# Patient Record
Sex: MALE | Race: WHITE | Employment: FULL TIME | URBAN - METROPOLITAN AREA
[De-identification: names, ages, dates, MRNs, and addresses within clinical notes are randomized per-mention and may not be internally consistent; named-entity substitution may affect disease eponyms.]

---

## 2018-05-16 ENCOUNTER — OFFICE VISIT (OUTPATIENT)
Dept: PRIMARY CARE CLINIC | Age: 48
End: 2018-05-16
Payer: COMMERCIAL

## 2018-05-16 VITALS
SYSTOLIC BLOOD PRESSURE: 126 MMHG | DIASTOLIC BLOOD PRESSURE: 84 MMHG | TEMPERATURE: 98.4 F | HEIGHT: 69 IN | BODY MASS INDEX: 21.62 KG/M2 | WEIGHT: 146 LBS | RESPIRATION RATE: 14 BRPM | HEART RATE: 68 BPM

## 2018-05-16 DIAGNOSIS — Z00.00 ANNUAL PHYSICAL EXAM: Primary | ICD-10-CM

## 2018-05-16 DIAGNOSIS — Z12.5 SCREENING FOR PROSTATE CANCER: ICD-10-CM

## 2018-05-16 DIAGNOSIS — E78.5 DYSLIPIDEMIA: ICD-10-CM

## 2018-05-16 PROCEDURE — 99396 PREV VISIT EST AGE 40-64: CPT | Performed by: FAMILY MEDICINE

## 2018-05-16 ASSESSMENT — PATIENT HEALTH QUESTIONNAIRE - PHQ9
1. LITTLE INTEREST OR PLEASURE IN DOING THINGS: 0
SUM OF ALL RESPONSES TO PHQ9 QUESTIONS 1 & 2: 0
2. FEELING DOWN, DEPRESSED OR HOPELESS: 0
SUM OF ALL RESPONSES TO PHQ QUESTIONS 1-9: 0

## 2018-05-16 ASSESSMENT — ENCOUNTER SYMPTOMS
NAUSEA: 0
STRIDOR: 0
PHOTOPHOBIA: 0
APNEA: 0
DIARRHEA: 0
CHEST TIGHTNESS: 0
CONSTIPATION: 0
EYE DISCHARGE: 0
CHOKING: 0
COLOR CHANGE: 0
WHEEZING: 0
FACIAL SWELLING: 0
EYE PAIN: 0
ABDOMINAL PAIN: 0
EYE REDNESS: 0

## 2018-06-02 DIAGNOSIS — Z00.00 ANNUAL PHYSICAL EXAM: ICD-10-CM

## 2018-06-02 DIAGNOSIS — E78.5 DYSLIPIDEMIA: ICD-10-CM

## 2018-06-02 DIAGNOSIS — Z12.5 SCREENING FOR PROSTATE CANCER: ICD-10-CM

## 2018-06-02 LAB
ALBUMIN SERPL-MCNC: 4.9 G/DL (ref 3.9–4.9)
ALP BLD-CCNC: 70 U/L (ref 35–104)
ALT SERPL-CCNC: 20 U/L (ref 0–41)
ANION GAP SERPL CALCULATED.3IONS-SCNC: 18 MEQ/L (ref 7–13)
AST SERPL-CCNC: 22 U/L (ref 0–40)
BASOPHILS ABSOLUTE: 0 K/UL (ref 0–0.2)
BASOPHILS RELATIVE PERCENT: 0.5 %
BILIRUB SERPL-MCNC: 0.6 MG/DL (ref 0–1.2)
BUN BLDV-MCNC: 13 MG/DL (ref 6–20)
CALCIUM SERPL-MCNC: 8.9 MG/DL (ref 8.6–10.2)
CHLORIDE BLD-SCNC: 99 MEQ/L (ref 98–107)
CHOLESTEROL, TOTAL: 172 MG/DL (ref 0–199)
CO2: 26 MEQ/L (ref 22–29)
CREAT SERPL-MCNC: 0.86 MG/DL (ref 0.7–1.2)
EOSINOPHILS ABSOLUTE: 0.1 K/UL (ref 0–0.7)
EOSINOPHILS RELATIVE PERCENT: 2.7 %
GFR AFRICAN AMERICAN: >60
GFR NON-AFRICAN AMERICAN: >60
GLOBULIN: 2.5 G/DL (ref 2.3–3.5)
GLUCOSE BLD-MCNC: 77 MG/DL (ref 74–109)
HCT VFR BLD CALC: 45 % (ref 42–52)
HDLC SERPL-MCNC: 39 MG/DL (ref 40–59)
HEMOGLOBIN: 15.2 G/DL (ref 14–18)
LDL CHOLESTEROL CALCULATED: 113 MG/DL (ref 0–129)
LYMPHOCYTES ABSOLUTE: 1 K/UL (ref 1–4.8)
LYMPHOCYTES RELATIVE PERCENT: 22.6 %
MCH RBC QN AUTO: 31.3 PG (ref 27–31.3)
MCHC RBC AUTO-ENTMCNC: 33.7 % (ref 33–37)
MCV RBC AUTO: 92.8 FL (ref 80–100)
MONOCYTES ABSOLUTE: 0.4 K/UL (ref 0.2–0.8)
MONOCYTES RELATIVE PERCENT: 8.5 %
NEUTROPHILS ABSOLUTE: 3 K/UL (ref 1.4–6.5)
NEUTROPHILS RELATIVE PERCENT: 65.7 %
PDW BLD-RTO: 12.5 % (ref 11.5–14.5)
PLATELET # BLD: 219 K/UL (ref 130–400)
POTASSIUM SERPL-SCNC: 4.4 MEQ/L (ref 3.5–5.1)
PROSTATE SPECIFIC ANTIGEN: 0.72 NG/ML
RBC # BLD: 4.85 M/UL (ref 4.7–6.1)
SODIUM BLD-SCNC: 143 MEQ/L (ref 132–144)
TOTAL PROTEIN: 7.4 G/DL (ref 6.4–8.1)
TRIGL SERPL-MCNC: 100 MG/DL (ref 0–200)
WBC # BLD: 4.6 K/UL (ref 4.8–10.8)

## 2018-06-07 ENCOUNTER — PATIENT MESSAGE (OUTPATIENT)
Dept: PRIMARY CARE CLINIC | Age: 48
End: 2018-06-07

## 2024-04-24 ENCOUNTER — OFFICE VISIT (OUTPATIENT)
Dept: PRIMARY CARE | Facility: CLINIC | Age: 54
End: 2024-04-24
Payer: COMMERCIAL

## 2024-04-24 VITALS
DIASTOLIC BLOOD PRESSURE: 80 MMHG | WEIGHT: 144 LBS | HEART RATE: 70 BPM | BODY MASS INDEX: 21.33 KG/M2 | SYSTOLIC BLOOD PRESSURE: 126 MMHG | HEIGHT: 69 IN | RESPIRATION RATE: 16 BRPM | OXYGEN SATURATION: 100 %

## 2024-04-24 DIAGNOSIS — Z00.00 ANNUAL PHYSICAL EXAM: ICD-10-CM

## 2024-04-24 DIAGNOSIS — Z00.00 HEALTHCARE MAINTENANCE: ICD-10-CM

## 2024-04-24 DIAGNOSIS — E78.5 DYSLIPIDEMIA: ICD-10-CM

## 2024-04-24 PROBLEM — R53.83 FATIGUE: Status: ACTIVE | Noted: 2024-04-24

## 2024-04-24 PROCEDURE — 99396 PREV VISIT EST AGE 40-64: CPT | Performed by: FAMILY MEDICINE

## 2024-04-24 ASSESSMENT — PATIENT HEALTH QUESTIONNAIRE - PHQ9
2. FEELING DOWN, DEPRESSED OR HOPELESS: NOT AT ALL
SUM OF ALL RESPONSES TO PHQ9 QUESTIONS 1 AND 2: 0
1. LITTLE INTEREST OR PLEASURE IN DOING THINGS: NOT AT ALL

## 2024-04-24 NOTE — PROGRESS NOTES
Subjective   Patient ID: Ramon Feliz is a 53 y.o. male who presents for Annual Exam.    Patient presents in office for a annual exam. Patient has no specific requirements for this examination.          Review of Systems  12 Systems have been reviewed as follows.   Constitutional: Fever, weight gain, weight loss, appetite change, night sweats, fatigue, chills.  Eyes : blurry, double vision, vision, loss, tearing, redness, pain, sensitivity to light, glaucoma.  Ears, nose, mouth, and throat: Hearing loss, ringing in the ears, ear pain, nasal congestion, nasal drainage, nosebleeds, mouth, throat, irritation tooth problem.  Cardiovascular :chest pain, pressure, heart racing, palpitations, sweating, leg swelling, high or low blood pressure  Pulmonary: Cough, yellow or green sputum, blood and sputum, shortness of breath, wheezing  Gastrointestinal: Nausea, vomiting, diarrhea, constipation, pain, blood in stool, or vomitus, heartburn, difficulty swallowing  Genitourinary: incontinence, abnormal bleeding, abnormal discharge, urinary frequency, urinary hesitancy, pain, impotence sexual problem, infection, urinary retention  Musculoskeletal: Pain, stiffness, joint, redness or warmth, arthritis, back pain, weakness, muscle wasting, sprain or fracture  Neuro: Weight weakness, dizziness, change in voice, change in taste change in vision, change in hearing, loss, or change of sensation, trouble walking, balance problems coordination problems, shaking, speech problem  Endocrine , cold or heat intolerance, blood sugar problem, weight gain or loss missed periods hot flashes, sweats, change in body hair, change in libido, increased thirst, increased urination  Heme/lymph: Swelling, bleeding, problem anemia, bruising, enlarged lymph nodes  Allergic/immunologic: H. plus nasal drip, watery itchy eyes, nasal drainage, immunosuppressed  The above were reviewed and noted negative except as noted in HPI and Problem List.    Objective  "  /80 (BP Location: Left arm, Patient Position: Sitting, BP Cuff Size: Adult)   Pulse 70   Resp 16   Ht 1.753 m (5' 9\")   Wt 65.3 kg (144 lb)   SpO2 100%   BMI 21.27 kg/m²     Physical Exam  Constitutional: Well developed, well nourished, alert and in no acute distress  Eyes: Normal external exam. Pupils equally round and reactive to light with normal accommodation and extraocular movements intact.  Neck: Supple, no lymphadenopathy or masses.  Cardiovascular: Regular rate and rhythm, normal S1 and S2, no murmurs, gallops, or rubs. Radial pulses normal. No peripheral edema.  Abdomen: soft, non tender, distended, no masses or HSM.  Pulmonary: No respiratory distress, lungs clear to auscultation bilaterally. No wheezes, rhonchi, rales.  Skin: Warm, well perfused, normal skin turgor and color.  Neurologic: Cranial nerves II-XII grossly intact.  Psychiatric: Mood calm and affect normal  Musculoskeletal: Moving all extremities without restriction    Assessment/Plan   Problem List Items Addressed This Visit             ICD-10-CM    Dyslipidemia E78.5    Relevant Orders    CT cardiac scoring wo IV contrast     Other Visit Diagnoses         Codes    Annual physical exam     Z00.00    Relevant Orders    CBC and Auto Differential    Comprehensive Metabolic Panel    Lipid Panel    Prostate Specific Antigen, Screen    Thyroid Stimulating Hormone    Vitamin D 25-Hydroxy,Total (for eval of Vitamin D levels)    T4    T4, free    Healthcare maintenance     Z00.00    Relevant Orders    CBC and Auto Differential    Comprehensive Metabolic Panel    Lipid Panel    Prostate Specific Antigen, Screen    Thyroid Stimulating Hormone    Vitamin D 25-Hydroxy,Total (for eval of Vitamin D levels)    T4    T4, free             Scribe Attestation  By signing my name below, I, Alphonso Patel DO , Scribe   attest that this documentation has been prepared under the direction and in the presence of Alphonso Patel DO.    Provider " Attestation - Scribe documentation    All medical record entries made by the Scribe were at my direction and personally dictated by me. I have reviewed the chart and agree that the record accurately reflects my personal performance of the history, physical exam, discussion and plan.

## 2024-04-25 ENCOUNTER — LAB (OUTPATIENT)
Dept: LAB | Facility: LAB | Age: 54
End: 2024-04-25
Payer: COMMERCIAL

## 2024-04-25 DIAGNOSIS — Z00.00 HEALTHCARE MAINTENANCE: ICD-10-CM

## 2024-04-25 DIAGNOSIS — Z00.00 ANNUAL PHYSICAL EXAM: ICD-10-CM

## 2024-04-25 LAB
25(OH)D3 SERPL-MCNC: 22 NG/ML (ref 30–100)
ALBUMIN SERPL BCP-MCNC: 4.7 G/DL (ref 3.4–5)
ALP SERPL-CCNC: 66 U/L (ref 33–120)
ALT SERPL W P-5'-P-CCNC: 19 U/L (ref 10–52)
ANION GAP SERPL CALC-SCNC: 10 MMOL/L (ref 10–20)
AST SERPL W P-5'-P-CCNC: 19 U/L (ref 9–39)
BASOPHILS # BLD AUTO: 0.02 X10*3/UL (ref 0–0.1)
BASOPHILS NFR BLD AUTO: 0.4 %
BILIRUB SERPL-MCNC: 0.9 MG/DL (ref 0–1.2)
BUN SERPL-MCNC: 15 MG/DL (ref 6–23)
CALCIUM SERPL-MCNC: 9.5 MG/DL (ref 8.6–10.3)
CHLORIDE SERPL-SCNC: 103 MMOL/L (ref 98–107)
CHOLEST SERPL-MCNC: 185 MG/DL (ref 0–199)
CHOLESTEROL/HDL RATIO: 4.4
CO2 SERPL-SCNC: 32 MMOL/L (ref 21–32)
CREAT SERPL-MCNC: 1.03 MG/DL (ref 0.5–1.3)
EGFRCR SERPLBLD CKD-EPI 2021: 87 ML/MIN/1.73M*2
EOSINOPHIL # BLD AUTO: 0.11 X10*3/UL (ref 0–0.7)
EOSINOPHIL NFR BLD AUTO: 2 %
ERYTHROCYTE [DISTWIDTH] IN BLOOD BY AUTOMATED COUNT: 11.9 % (ref 11.5–14.5)
GLUCOSE SERPL-MCNC: 87 MG/DL (ref 74–99)
HCT VFR BLD AUTO: 45.8 % (ref 41–52)
HDLC SERPL-MCNC: 42.4 MG/DL
HGB BLD-MCNC: 15.6 G/DL (ref 13.5–17.5)
IMM GRANULOCYTES # BLD AUTO: 0.02 X10*3/UL (ref 0–0.7)
IMM GRANULOCYTES NFR BLD AUTO: 0.4 % (ref 0–0.9)
LDLC SERPL CALC-MCNC: 121 MG/DL
LYMPHOCYTES # BLD AUTO: 1.38 X10*3/UL (ref 1.2–4.8)
LYMPHOCYTES NFR BLD AUTO: 25.2 %
MCH RBC QN AUTO: 31 PG (ref 26–34)
MCHC RBC AUTO-ENTMCNC: 34.1 G/DL (ref 32–36)
MCV RBC AUTO: 91 FL (ref 80–100)
MONOCYTES # BLD AUTO: 0.47 X10*3/UL (ref 0.1–1)
MONOCYTES NFR BLD AUTO: 8.6 %
NEUTROPHILS # BLD AUTO: 3.48 X10*3/UL (ref 1.2–7.7)
NEUTROPHILS NFR BLD AUTO: 63.4 %
NON HDL CHOLESTEROL: 143 MG/DL (ref 0–149)
NRBC BLD-RTO: 0 /100 WBCS (ref 0–0)
PLATELET # BLD AUTO: 256 X10*3/UL (ref 150–450)
POTASSIUM SERPL-SCNC: 4.8 MMOL/L (ref 3.5–5.3)
PROT SERPL-MCNC: 7.5 G/DL (ref 6.4–8.2)
PSA SERPL-MCNC: 0.69 NG/ML
RBC # BLD AUTO: 5.04 X10*6/UL (ref 4.5–5.9)
SODIUM SERPL-SCNC: 140 MMOL/L (ref 136–145)
T4 FREE SERPL-MCNC: 0.92 NG/DL (ref 0.61–1.12)
T4 SERPL-MCNC: 8.3 UG/DL (ref 4.5–11.1)
TRIGL SERPL-MCNC: 108 MG/DL (ref 0–149)
TSH SERPL-ACNC: 1.96 MIU/L (ref 0.44–3.98)
VLDL: 22 MG/DL (ref 0–40)
WBC # BLD AUTO: 5.5 X10*3/UL (ref 4.4–11.3)

## 2024-04-25 PROCEDURE — 82306 VITAMIN D 25 HYDROXY: CPT

## 2024-04-25 PROCEDURE — 85025 COMPLETE CBC W/AUTO DIFF WBC: CPT

## 2024-04-25 PROCEDURE — 84439 ASSAY OF FREE THYROXINE: CPT

## 2024-04-25 PROCEDURE — 80053 COMPREHEN METABOLIC PANEL: CPT

## 2024-04-25 PROCEDURE — 84436 ASSAY OF TOTAL THYROXINE: CPT

## 2024-04-25 PROCEDURE — 84443 ASSAY THYROID STIM HORMONE: CPT

## 2024-04-25 PROCEDURE — 80061 LIPID PANEL: CPT

## 2024-04-25 PROCEDURE — 36415 COLL VENOUS BLD VENIPUNCTURE: CPT

## 2024-04-25 PROCEDURE — 84153 ASSAY OF PSA TOTAL: CPT

## 2024-04-30 DIAGNOSIS — E55.9 VITAMIN D DEFICIENCY: ICD-10-CM

## 2024-05-10 RX ORDER — ERGOCALCIFEROL 1.25 MG/1
50000 CAPSULE ORAL
Qty: 12 CAPSULE | Refills: 3 | Status: SHIPPED | OUTPATIENT
Start: 2024-05-12

## 2024-05-24 ENCOUNTER — HOSPITAL ENCOUNTER (OUTPATIENT)
Dept: RADIOLOGY | Facility: CLINIC | Age: 54
Discharge: HOME | End: 2024-05-24
Payer: COMMERCIAL

## 2024-05-24 DIAGNOSIS — E78.5 DYSLIPIDEMIA: ICD-10-CM

## 2024-05-24 PROCEDURE — 75571 CT HRT W/O DYE W/CA TEST: CPT

## 2025-05-07 ENCOUNTER — APPOINTMENT (OUTPATIENT)
Dept: PRIMARY CARE | Facility: CLINIC | Age: 55
End: 2025-05-07
Payer: COMMERCIAL

## 2025-05-07 VITALS
WEIGHT: 152 LBS | HEIGHT: 69 IN | TEMPERATURE: 98 F | OXYGEN SATURATION: 97 % | DIASTOLIC BLOOD PRESSURE: 72 MMHG | HEART RATE: 67 BPM | SYSTOLIC BLOOD PRESSURE: 126 MMHG | BODY MASS INDEX: 22.51 KG/M2

## 2025-05-07 DIAGNOSIS — Z12.5 SCREENING PSA (PROSTATE SPECIFIC ANTIGEN): ICD-10-CM

## 2025-05-07 DIAGNOSIS — E55.9 VITAMIN D DEFICIENCY: ICD-10-CM

## 2025-05-07 DIAGNOSIS — R73.9 HYPERGLYCEMIA: ICD-10-CM

## 2025-05-07 DIAGNOSIS — Z00.00 ANNUAL PHYSICAL EXAM: Primary | ICD-10-CM

## 2025-05-07 DIAGNOSIS — E78.5 DYSLIPIDEMIA: ICD-10-CM

## 2025-05-07 DIAGNOSIS — R53.83 FATIGUE, UNSPECIFIED TYPE: ICD-10-CM

## 2025-05-07 PROCEDURE — 99396 PREV VISIT EST AGE 40-64: CPT | Performed by: FAMILY MEDICINE

## 2025-05-07 ASSESSMENT — ENCOUNTER SYMPTOMS
OCCASIONAL FEELINGS OF UNSTEADINESS: 0
SORE THROAT: 0
MYALGIAS: 0
CONSTIPATION: 0
STRIDOR: 0
FLANK PAIN: 0
DIZZINESS: 0
POLYPHAGIA: 0
SEIZURES: 0
DIARRHEA: 0
PALPITATIONS: 0
APPETITE CHANGE: 0
DYSURIA: 0
SINUS PAIN: 0
CONSTITUTIONAL NEGATIVE: 1
DYSPHORIC MOOD: 0
NERVOUS/ANXIOUS: 0
COLOR CHANGE: 0
ADENOPATHY: 0
HEADACHES: 0
DEPRESSION: 0
ACTIVITY CHANGE: 0
TROUBLE SWALLOWING: 0
SHORTNESS OF BREATH: 0
CHEST TIGHTNESS: 0
FEVER: 0
SPEECH DIFFICULTY: 0
ABDOMINAL PAIN: 0
BLOOD IN STOOL: 0
LOSS OF SENSATION IN FEET: 0
POLYDIPSIA: 0
SLEEP DISTURBANCE: 0
ABDOMINAL DISTENTION: 0
PHOTOPHOBIA: 0
FATIGUE: 0
SINUS PRESSURE: 0
RECTAL PAIN: 0
ARTHRALGIAS: 0
DECREASED CONCENTRATION: 0
HEMATURIA: 0
RHINORRHEA: 0
CONFUSION: 0
NECK STIFFNESS: 0
EYE PAIN: 0
AGITATION: 0
COUGH: 0

## 2025-05-07 ASSESSMENT — PATIENT HEALTH QUESTIONNAIRE - PHQ9
1. LITTLE INTEREST OR PLEASURE IN DOING THINGS: NOT AT ALL
SUM OF ALL RESPONSES TO PHQ9 QUESTIONS 1 AND 2: 0
2. FEELING DOWN, DEPRESSED OR HOPELESS: NOT AT ALL

## 2025-05-07 NOTE — PROGRESS NOTES
"Subjective   Patient ID: Ramon Feliz is a 54 y.o. male who presents for Annual Exam.    HPI   Patient presents today for a physical. No forms need filled out. Not fasting for blood work. Tries to eat a generally healthy diet. Exercises 6 days a week for a minimum of 30 minutes.  Review of Systems   Constitutional: Negative.  Negative for activity change, appetite change, fatigue and fever.   HENT:  Negative for congestion, dental problem, ear discharge, ear pain, mouth sores, rhinorrhea, sinus pressure, sinus pain, sore throat, tinnitus and trouble swallowing.    Eyes:  Negative for photophobia, pain and visual disturbance.   Respiratory:  Negative for cough, chest tightness, shortness of breath and stridor.    Cardiovascular:  Negative for chest pain and palpitations.   Gastrointestinal:  Negative for abdominal distention, abdominal pain, blood in stool, constipation, diarrhea and rectal pain.   Endocrine: Negative for cold intolerance, heat intolerance, polydipsia, polyphagia and polyuria.   Genitourinary:  Negative for dysuria, flank pain, hematuria and urgency.   Musculoskeletal:  Negative for arthralgias, gait problem, myalgias and neck stiffness.   Skin:  Negative for color change and rash.   Allergic/Immunologic: Negative for environmental allergies and food allergies.   Neurological:  Negative for dizziness, seizures, syncope, speech difficulty and headaches.   Hematological:  Negative for adenopathy.   Psychiatric/Behavioral:  Negative for agitation, confusion, decreased concentration, dysphoric mood and sleep disturbance. The patient is not nervous/anxious.        Objective   /72   Pulse 67   Temp 36.7 °C (98 °F) (Temporal)   Ht 1.753 m (5' 9\")   Wt 68.9 kg (152 lb)   SpO2 97%   BMI 22.45 kg/m²     Physical Exam  Vitals reviewed.   Constitutional:       General: He is not in acute distress.     Appearance: Normal appearance. He is normal weight. He is not ill-appearing or diaphoretic. "   HENT:      Head: Normocephalic.      Right Ear: Tympanic membrane and external ear normal.      Left Ear: Tympanic membrane and external ear normal.      Nose: Nose normal. No congestion.      Mouth/Throat:      Pharynx: No posterior oropharyngeal erythema.   Eyes:      General:         Right eye: No discharge.         Left eye: No discharge.      Extraocular Movements: Extraocular movements intact.      Conjunctiva/sclera: Conjunctivae normal.      Pupils: Pupils are equal, round, and reactive to light.   Cardiovascular:      Rate and Rhythm: Normal rate and regular rhythm.      Pulses: Normal pulses.      Heart sounds: Normal heart sounds. No murmur heard.  Pulmonary:      Effort: Pulmonary effort is normal. No respiratory distress.      Breath sounds: Normal breath sounds. No wheezing or rales.   Chest:      Chest wall: No tenderness.   Abdominal:      General: Abdomen is flat. Bowel sounds are normal. There is no distension.      Palpations: There is no mass.      Tenderness: There is no abdominal tenderness. There is no guarding.   Musculoskeletal:         General: No tenderness. Normal range of motion.      Cervical back: Normal range of motion and neck supple. No tenderness.      Right lower leg: No edema.      Left lower leg: No edema.   Skin:     General: Skin is dry.      Coloration: Skin is not jaundiced.      Findings: No bruising, erythema or rash.   Neurological:      General: No focal deficit present.      Mental Status: He is alert and oriented to person, place, and time. Mental status is at baseline.      Cranial Nerves: No cranial nerve deficit.      Sensory: No sensory deficit.      Coordination: Coordination normal.      Gait: Gait normal.   Psychiatric:         Mood and Affect: Mood normal.         Thought Content: Thought content normal.         Judgment: Judgment normal.         Assessment/Plan   Problem List Items Addressed This Visit           ICD-10-CM    Dyslipidemia E78.5    Relevant  Orders    Comprehensive Metabolic Panel    Lipid Panel    CBC and Auto Differential    Fatigue R53.83    Relevant Orders    Thyroid Stimulating Hormone    Thyroxine, Total    Thyroxine, Free    Prostate Specific Antigen, Screen     Other Visit Diagnoses         Codes      Annual physical exam    -  Primary Z00.00      Screening PSA (prostate specific antigen)     Z12.5      Vitamin D deficiency     E55.9    Relevant Orders    Vitamin D 25-Hydroxy,Total (for eval of Vitamin D levels)      Hyperglycemia     R73.9    Relevant Orders    Hemoglobin A1C          Scribe Attestation  By signing my name below, I, DARSHAN Jaramillo Scribe   attest that this documentation has been prepared under the direction and in the presence of Alphonso Patel DO.     All medical record entries made by the Magdielibe were at my direction and personally dictated by me. I have reviewed the chart and agree that the record accurately reflects my personal performance of the history, physical exam, discussion and plan.

## 2025-05-10 LAB
25(OH)D3+25(OH)D2 SERPL-MCNC: 68 NG/ML (ref 30–100)
ALBUMIN SERPL-MCNC: 4.6 G/DL (ref 3.6–5.1)
ALP SERPL-CCNC: 69 U/L (ref 35–144)
ALT SERPL-CCNC: 24 U/L (ref 9–46)
ANION GAP SERPL CALCULATED.4IONS-SCNC: 9 MMOL/L (CALC) (ref 7–17)
AST SERPL-CCNC: 21 U/L (ref 10–35)
BASOPHILS # BLD AUTO: 29 CELLS/UL (ref 0–200)
BASOPHILS NFR BLD AUTO: 0.5 %
BILIRUB SERPL-MCNC: 0.7 MG/DL (ref 0.2–1.2)
BUN SERPL-MCNC: 16 MG/DL (ref 7–25)
CALCIUM SERPL-MCNC: 9.1 MG/DL (ref 8.6–10.3)
CHLORIDE SERPL-SCNC: 103 MMOL/L (ref 98–110)
CHOLEST SERPL-MCNC: 186 MG/DL
CHOLEST/HDLC SERPL: 4.7 (CALC)
CO2 SERPL-SCNC: 29 MMOL/L (ref 20–32)
CREAT SERPL-MCNC: 0.93 MG/DL (ref 0.7–1.3)
EGFRCR SERPLBLD CKD-EPI 2021: 98 ML/MIN/1.73M2
EOSINOPHIL # BLD AUTO: 162 CELLS/UL (ref 15–500)
EOSINOPHIL NFR BLD AUTO: 2.8 %
ERYTHROCYTE [DISTWIDTH] IN BLOOD BY AUTOMATED COUNT: 11.6 % (ref 11–15)
EST. AVERAGE GLUCOSE BLD GHB EST-MCNC: 103 MG/DL
EST. AVERAGE GLUCOSE BLD GHB EST-SCNC: 5.7 MMOL/L
GLUCOSE SERPL-MCNC: 93 MG/DL (ref 65–99)
HBA1C MFR BLD: 5.2 %
HCT VFR BLD AUTO: 44.7 % (ref 38.5–50)
HDLC SERPL-MCNC: 40 MG/DL
HGB BLD-MCNC: 15.2 G/DL (ref 13.2–17.1)
LDLC SERPL CALC-MCNC: 127 MG/DL (CALC)
LYMPHOCYTES # BLD AUTO: 1282 CELLS/UL (ref 850–3900)
LYMPHOCYTES NFR BLD AUTO: 22.1 %
MCH RBC QN AUTO: 31.5 PG (ref 27–33)
MCHC RBC AUTO-ENTMCNC: 34 G/DL (ref 32–36)
MCV RBC AUTO: 92.5 FL (ref 80–100)
MONOCYTES # BLD AUTO: 563 CELLS/UL (ref 200–950)
MONOCYTES NFR BLD AUTO: 9.7 %
NEUTROPHILS # BLD AUTO: 3764 CELLS/UL (ref 1500–7800)
NEUTROPHILS NFR BLD AUTO: 64.9 %
NONHDLC SERPL-MCNC: 146 MG/DL (CALC)
PLATELET # BLD AUTO: 226 THOUSAND/UL (ref 140–400)
PMV BLD REES-ECKER: 12.2 FL (ref 7.5–12.5)
POTASSIUM SERPL-SCNC: 4.5 MMOL/L (ref 3.5–5.3)
PROT SERPL-MCNC: 7.4 G/DL (ref 6.1–8.1)
PSA SERPL-MCNC: 0.59 NG/ML
RBC # BLD AUTO: 4.83 MILLION/UL (ref 4.2–5.8)
SODIUM SERPL-SCNC: 141 MMOL/L (ref 135–146)
T4 FREE SERPL-MCNC: 1.1 NG/DL (ref 0.8–1.8)
T4 SERPL-MCNC: 7.8 MCG/DL (ref 4.9–10.5)
TRIGL SERPL-MCNC: 93 MG/DL
TSH SERPL-ACNC: 1.74 MIU/L (ref 0.4–4.5)
WBC # BLD AUTO: 5.8 THOUSAND/UL (ref 3.8–10.8)

## 2026-05-13 ENCOUNTER — APPOINTMENT (OUTPATIENT)
Dept: PRIMARY CARE | Facility: CLINIC | Age: 56
End: 2026-05-13
Payer: COMMERCIAL